# Patient Record
Sex: MALE | Race: WHITE | Employment: STUDENT | URBAN - METROPOLITAN AREA
[De-identification: names, ages, dates, MRNs, and addresses within clinical notes are randomized per-mention and may not be internally consistent; named-entity substitution may affect disease eponyms.]

---
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
## 2025-01-22 ENCOUNTER — OFFICE VISIT (OUTPATIENT)
Age: 20
End: 2025-01-22

## 2025-01-22 VITALS
WEIGHT: 200 LBS | BODY MASS INDEX: 26.51 KG/M2 | SYSTOLIC BLOOD PRESSURE: 110 MMHG | RESPIRATION RATE: 18 BRPM | OXYGEN SATURATION: 98 % | HEIGHT: 73 IN | HEART RATE: 97 BPM | DIASTOLIC BLOOD PRESSURE: 70 MMHG | TEMPERATURE: 98.9 F

## 2025-01-22 DIAGNOSIS — F31.9 BIPOLAR 1 DISORDER (HCC): Primary | ICD-10-CM

## 2025-01-22 DIAGNOSIS — L98.9 SKIN LESION OF BACK: ICD-10-CM

## 2025-01-22 SDOH — ECONOMIC STABILITY: FOOD INSECURITY: WITHIN THE PAST 12 MONTHS, THE FOOD YOU BOUGHT JUST DIDN'T LAST AND YOU DIDN'T HAVE MONEY TO GET MORE.: NEVER TRUE

## 2025-01-22 SDOH — ECONOMIC STABILITY: FOOD INSECURITY: WITHIN THE PAST 12 MONTHS, YOU WORRIED THAT YOUR FOOD WOULD RUN OUT BEFORE YOU GOT MONEY TO BUY MORE.: NEVER TRUE

## 2025-01-23 ASSESSMENT — ENCOUNTER SYMPTOMS
GASTROINTESTINAL NEGATIVE: 1
RESPIRATORY NEGATIVE: 1
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
## 2025-03-12 ENCOUNTER — HOSPITAL ENCOUNTER (OUTPATIENT)
Age: 20
Setting detail: OBSERVATION
Discharge: PSYCHIATRIC OR SUBSTANCE USE FACILITY | End: 2025-03-15
Attending: STUDENT IN AN ORGANIZED HEALTH CARE EDUCATION/TRAINING PROGRAM | Admitting: HOSPITALIST
Payer: MEDICAID

## 2025-03-12 DIAGNOSIS — T65.91XA INGESTION OF TOXIC SUBSTANCE: Primary | ICD-10-CM

## 2025-03-12 LAB
ALBUMIN SERPL-MCNC: 4.6 G/DL (ref 3.5–5.2)
ALP SERPL-CCNC: 93 U/L (ref 40–129)
ALT SERPL-CCNC: 11 U/L (ref 0–40)
AMMONIA PLAS-SCNC: 25 UMOL/L (ref 16–60)
ANION GAP SERPL CALCULATED.3IONS-SCNC: 11 MMOL/L (ref 7–16)
AST SERPL-CCNC: 15 U/L (ref 0–39)
BASOPHILS # BLD: 0.07 K/UL (ref 0–0.2)
BASOPHILS NFR BLD: 1 % (ref 0–2)
BILIRUB SERPL-MCNC: 0.4 MG/DL (ref 0–1.2)
BUN SERPL-MCNC: 12 MG/DL (ref 6–20)
CALCIUM SERPL-MCNC: 9.5 MG/DL (ref 8.6–10.2)
CHLORIDE SERPL-SCNC: 103 MMOL/L (ref 98–107)
CK SERPL-CCNC: 109 U/L (ref 20–200)
CO2 SERPL-SCNC: 25 MMOL/L (ref 22–29)
CREAT SERPL-MCNC: 1.1 MG/DL (ref 0.7–1.2)
EOSINOPHIL # BLD: 0.12 K/UL (ref 0.05–0.5)
EOSINOPHILS RELATIVE PERCENT: 2 % (ref 0–6)
ERYTHROCYTE [DISTWIDTH] IN BLOOD BY AUTOMATED COUNT: 12.4 % (ref 11.5–15)
GFR, ESTIMATED: >90 ML/MIN/1.73M2
GLUCOSE SERPL-MCNC: 100 MG/DL (ref 74–99)
HCT VFR BLD AUTO: 44.9 % (ref 37–54)
HGB BLD-MCNC: 14.8 G/DL (ref 12.5–16.5)
IMM GRANULOCYTES # BLD AUTO: <0.03 K/UL (ref 0–0.58)
IMM GRANULOCYTES NFR BLD: 0 % (ref 0–5)
INR PPP: 1.3
LACTATE BLDV-SCNC: 0.9 MMOL/L (ref 0.5–2.2)
LYMPHOCYTES NFR BLD: 2.04 K/UL (ref 1.5–4)
LYMPHOCYTES RELATIVE PERCENT: 32 % (ref 20–42)
MCH RBC QN AUTO: 29.1 PG (ref 26–35)
MCHC RBC AUTO-ENTMCNC: 33 G/DL (ref 32–34.5)
MCV RBC AUTO: 88.2 FL (ref 80–99.9)
MONOCYTES NFR BLD: 0.42 K/UL (ref 0.1–0.95)
MONOCYTES NFR BLD: 7 % (ref 2–12)
NEUTROPHILS NFR BLD: 59 % (ref 43–80)
NEUTS SEG NFR BLD: 3.82 K/UL (ref 1.8–7.3)
PARTIAL THROMBOPLASTIN TIME: 31 SEC (ref 24.5–35.1)
PHOSPHATE SERPL-MCNC: 3.5 MG/DL (ref 2.5–4.5)
PLATELET # BLD AUTO: 234 K/UL (ref 130–450)
PMV BLD AUTO: 9.1 FL (ref 7–12)
POTASSIUM SERPL-SCNC: 3.9 MMOL/L (ref 3.5–5)
PROT SERPL-MCNC: 7.8 G/DL (ref 6.4–8.3)
PROTHROMBIN TIME: 13.7 SEC (ref 9.3–12.4)
RBC # BLD AUTO: 5.09 M/UL (ref 3.8–5.8)
SODIUM SERPL-SCNC: 139 MMOL/L (ref 132–146)
WBC OTHER # BLD: 6.5 K/UL (ref 4.5–11.5)

## 2025-03-12 PROCEDURE — G0378 HOSPITAL OBSERVATION PER HR: HCPCS

## 2025-03-12 PROCEDURE — 83605 ASSAY OF LACTIC ACID: CPT

## 2025-03-12 PROCEDURE — 80307 DRUG TEST PRSMV CHEM ANLYZR: CPT

## 2025-03-12 PROCEDURE — 85025 COMPLETE CBC W/AUTO DIFF WBC: CPT

## 2025-03-12 PROCEDURE — 80178 ASSAY OF LITHIUM: CPT

## 2025-03-12 PROCEDURE — 82140 ASSAY OF AMMONIA: CPT

## 2025-03-12 PROCEDURE — G0480 DRUG TEST DEF 1-7 CLASSES: HCPCS

## 2025-03-12 PROCEDURE — 96374 THER/PROPH/DIAG INJ IV PUSH: CPT

## 2025-03-12 PROCEDURE — 80143 DRUG ASSAY ACETAMINOPHEN: CPT

## 2025-03-12 PROCEDURE — 6360000002 HC RX W HCPCS

## 2025-03-12 PROCEDURE — 84100 ASSAY OF PHOSPHORUS: CPT

## 2025-03-12 PROCEDURE — 82550 ASSAY OF CK (CPK): CPT

## 2025-03-12 PROCEDURE — 80053 COMPREHEN METABOLIC PANEL: CPT

## 2025-03-12 PROCEDURE — 80179 DRUG ASSAY SALICYLATE: CPT

## 2025-03-12 PROCEDURE — 99285 EMERGENCY DEPT VISIT HI MDM: CPT

## 2025-03-12 PROCEDURE — 93005 ELECTROCARDIOGRAM TRACING: CPT | Performed by: STUDENT IN AN ORGANIZED HEALTH CARE EDUCATION/TRAINING PROGRAM

## 2025-03-12 PROCEDURE — 85610 PROTHROMBIN TIME: CPT

## 2025-03-12 PROCEDURE — 85730 THROMBOPLASTIN TIME PARTIAL: CPT

## 2025-03-12 RX ORDER — LORAZEPAM 2 MG/ML
INJECTION INTRAMUSCULAR
Status: COMPLETED
Start: 2025-03-12 | End: 2025-03-12

## 2025-03-12 RX ORDER — LORAZEPAM 2 MG/ML
2 INJECTION INTRAMUSCULAR ONCE
Status: COMPLETED | OUTPATIENT
Start: 2025-03-12 | End: 2025-03-12

## 2025-03-12 RX ADMIN — LORAZEPAM 2 MG: 2 INJECTION INTRAMUSCULAR at 21:25

## 2025-03-12 RX ADMIN — LORAZEPAM 2 MG: 2 INJECTION INTRAMUSCULAR; INTRAVENOUS at 21:25

## 2025-03-12 ASSESSMENT — ENCOUNTER SYMPTOMS
VOMITING: 1
COUGH: 0
ABDOMINAL PAIN: 0
PHOTOPHOBIA: 0
SORE THROAT: 0
SHORTNESS OF BREATH: 0
BACK PAIN: 0
DIARRHEA: 0
NAUSEA: 0

## 2025-03-12 ASSESSMENT — LIFESTYLE VARIABLES
HOW OFTEN DO YOU HAVE A DRINK CONTAINING ALCOHOL: NEVER
HOW MANY STANDARD DRINKS CONTAINING ALCOHOL DO YOU HAVE ON A TYPICAL DAY: PATIENT DOES NOT DRINK

## 2025-03-13 PROBLEM — F17.200 NICOTINE USE DISORDER: Status: ACTIVE | Noted: 2024-04-02

## 2025-03-13 PROBLEM — F29 PSYCHOSIS (HCC): Status: ACTIVE | Noted: 2024-03-31

## 2025-03-13 PROBLEM — F06.1 CATATONIA: Status: ACTIVE | Noted: 2024-04-14

## 2025-03-13 LAB
AMPHET UR QL SCN: NEGATIVE
ANION GAP SERPL CALCULATED.3IONS-SCNC: 10 MMOL/L (ref 7–16)
APAP SERPL-MCNC: <5 UG/ML (ref 10–30)
BARBITURATES UR QL SCN: NEGATIVE
BASOPHILS # BLD: 0.08 K/UL (ref 0–0.2)
BASOPHILS NFR BLD: 2 % (ref 0–2)
BENZODIAZ UR QL: POSITIVE
BILIRUB UR QL STRIP: NEGATIVE
BUN SERPL-MCNC: 12 MG/DL (ref 6–20)
BUPRENORPHINE UR QL: NEGATIVE
CALCIUM SERPL-MCNC: 9.1 MG/DL (ref 8.6–10.2)
CANNABINOIDS UR QL SCN: POSITIVE
CHLORIDE SERPL-SCNC: 105 MMOL/L (ref 98–107)
CLARITY UR: CLEAR
CO2 SERPL-SCNC: 25 MMOL/L (ref 22–29)
COCAINE UR QL SCN: NEGATIVE
COLOR UR: YELLOW
CREAT SERPL-MCNC: 1 MG/DL (ref 0.7–1.2)
CRYSTALS URNS MICRO: ABNORMAL /HPF
EOSINOPHIL # BLD: 0.21 K/UL (ref 0.05–0.5)
EOSINOPHILS RELATIVE PERCENT: 4 % (ref 0–6)
ERYTHROCYTE [DISTWIDTH] IN BLOOD BY AUTOMATED COUNT: 12.4 % (ref 11.5–15)
ETHANOLAMINE SERPL-MCNC: <10 MG/DL (ref 0–0.08)
FENTANYL UR QL: NEGATIVE
GFR, ESTIMATED: >90 ML/MIN/1.73M2
GLUCOSE SERPL-MCNC: 103 MG/DL (ref 74–99)
GLUCOSE UR STRIP-MCNC: NEGATIVE MG/DL
HCT VFR BLD AUTO: 43.2 % (ref 37–54)
HGB BLD-MCNC: 14 G/DL (ref 12.5–16.5)
HGB UR QL STRIP.AUTO: NEGATIVE
IMM GRANULOCYTES # BLD AUTO: <0.03 K/UL (ref 0–0.58)
IMM GRANULOCYTES NFR BLD: 0 % (ref 0–5)
INR PPP: 1.3
KETONES UR STRIP-MCNC: NEGATIVE MG/DL
LEUKOCYTE ESTERASE UR QL STRIP: NEGATIVE
LITHIUM LEVEL: <0.1 MMOL/L (ref 0.5–1.5)
LYMPHOCYTES NFR BLD: 1.72 K/UL (ref 1.5–4)
LYMPHOCYTES RELATIVE PERCENT: 32 % (ref 20–42)
MCH RBC QN AUTO: 29 PG (ref 26–35)
MCHC RBC AUTO-ENTMCNC: 32.4 G/DL (ref 32–34.5)
MCV RBC AUTO: 89.6 FL (ref 80–99.9)
METHADONE UR QL: NEGATIVE
MONOCYTES NFR BLD: 0.45 K/UL (ref 0.1–0.95)
MONOCYTES NFR BLD: 8 % (ref 2–12)
NEUTROPHILS NFR BLD: 54 % (ref 43–80)
NEUTS SEG NFR BLD: 2.86 K/UL (ref 1.8–7.3)
NITRITE UR QL STRIP: NEGATIVE
OPIATES UR QL SCN: NEGATIVE
OXYCODONE UR QL SCN: NEGATIVE
PCP UR QL SCN: NEGATIVE
PH UR STRIP: 5.5 [PH] (ref 5–8)
PLATELET # BLD AUTO: 191 K/UL (ref 130–450)
PMV BLD AUTO: 9.2 FL (ref 7–12)
POTASSIUM SERPL-SCNC: 3.9 MMOL/L (ref 3.5–5)
PROT UR STRIP-MCNC: NEGATIVE MG/DL
PROTHROMBIN TIME: 13.4 SEC (ref 9.3–12.4)
RBC # BLD AUTO: 4.82 M/UL (ref 3.8–5.8)
RBC #/AREA URNS HPF: ABNORMAL /HPF
SALICYLATES SERPL-MCNC: <0.3 MG/DL (ref 0–30)
SODIUM SERPL-SCNC: 140 MMOL/L (ref 132–146)
SP GR UR STRIP: >1.03 (ref 1–1.03)
TEST INFORMATION: ABNORMAL
TOXIC TRICYCLIC SC,BLOOD: NEGATIVE
UROBILINOGEN UR STRIP-ACNC: 0.2 EU/DL (ref 0–1)
WBC #/AREA URNS HPF: ABNORMAL /HPF
WBC OTHER # BLD: 5.3 K/UL (ref 4.5–11.5)

## 2025-03-13 PROCEDURE — 6370000000 HC RX 637 (ALT 250 FOR IP)

## 2025-03-13 PROCEDURE — 80048 BASIC METABOLIC PNL TOTAL CA: CPT

## 2025-03-13 PROCEDURE — 85025 COMPLETE CBC W/AUTO DIFF WBC: CPT

## 2025-03-13 PROCEDURE — 90792 PSYCH DIAG EVAL W/MED SRVCS: CPT

## 2025-03-13 PROCEDURE — 6370000000 HC RX 637 (ALT 250 FOR IP): Performed by: NURSE PRACTITIONER

## 2025-03-13 PROCEDURE — 2500000003 HC RX 250 WO HCPCS: Performed by: HOSPITALIST

## 2025-03-13 PROCEDURE — 6370000000 HC RX 637 (ALT 250 FOR IP): Performed by: HOSPITALIST

## 2025-03-13 PROCEDURE — 99223 1ST HOSP IP/OBS HIGH 75: CPT | Performed by: HOSPITALIST

## 2025-03-13 PROCEDURE — 81001 URINALYSIS AUTO W/SCOPE: CPT

## 2025-03-13 PROCEDURE — G0378 HOSPITAL OBSERVATION PER HR: HCPCS

## 2025-03-13 PROCEDURE — 85610 PROTHROMBIN TIME: CPT

## 2025-03-13 PROCEDURE — 6370000000 HC RX 637 (ALT 250 FOR IP): Performed by: INTERNAL MEDICINE

## 2025-03-13 PROCEDURE — 80307 DRUG TEST PRSMV CHEM ANLYZR: CPT

## 2025-03-13 RX ORDER — CLONAZEPAM 0.5 MG/1
0.5 TABLET ORAL EVERY 12 HOURS
Status: DISCONTINUED | OUTPATIENT
Start: 2025-03-13 | End: 2025-03-13

## 2025-03-13 RX ORDER — MAGNESIUM HYDROXIDE/ALUMINUM HYDROXICE/SIMETHICONE 120; 1200; 1200 MG/30ML; MG/30ML; MG/30ML
30 SUSPENSION ORAL EVERY 6 HOURS PRN
Status: DISCONTINUED | OUTPATIENT
Start: 2025-03-13 | End: 2025-03-15 | Stop reason: HOSPADM

## 2025-03-13 RX ORDER — ACETAMINOPHEN 650 MG/1
650 SUPPOSITORY RECTAL EVERY 6 HOURS PRN
Status: DISCONTINUED | OUTPATIENT
Start: 2025-03-13 | End: 2025-03-15 | Stop reason: HOSPADM

## 2025-03-13 RX ORDER — POTASSIUM CHLORIDE 1500 MG/1
40 TABLET, EXTENDED RELEASE ORAL PRN
Status: DISCONTINUED | OUTPATIENT
Start: 2025-03-13 | End: 2025-03-15 | Stop reason: HOSPADM

## 2025-03-13 RX ORDER — ACETAMINOPHEN 325 MG/1
650 TABLET ORAL EVERY 6 HOURS PRN
Status: DISCONTINUED | OUTPATIENT
Start: 2025-03-13 | End: 2025-03-15 | Stop reason: HOSPADM

## 2025-03-13 RX ORDER — POLYETHYLENE GLYCOL 3350 17 G
2 POWDER IN PACKET (EA) ORAL
Status: DISCONTINUED | OUTPATIENT
Start: 2025-03-13 | End: 2025-03-15 | Stop reason: HOSPADM

## 2025-03-13 RX ORDER — ONDANSETRON 2 MG/ML
4 INJECTION INTRAMUSCULAR; INTRAVENOUS EVERY 6 HOURS PRN
Status: DISCONTINUED | OUTPATIENT
Start: 2025-03-13 | End: 2025-03-15 | Stop reason: HOSPADM

## 2025-03-13 RX ORDER — MAGNESIUM SULFATE IN WATER 40 MG/ML
2000 INJECTION, SOLUTION INTRAVENOUS PRN
Status: DISCONTINUED | OUTPATIENT
Start: 2025-03-13 | End: 2025-03-15 | Stop reason: HOSPADM

## 2025-03-13 RX ORDER — SODIUM CHLORIDE 9 MG/ML
INJECTION, SOLUTION INTRAVENOUS PRN
Status: DISCONTINUED | OUTPATIENT
Start: 2025-03-13 | End: 2025-03-15 | Stop reason: HOSPADM

## 2025-03-13 RX ORDER — POTASSIUM CHLORIDE 7.45 MG/ML
10 INJECTION INTRAVENOUS PRN
Status: DISCONTINUED | OUTPATIENT
Start: 2025-03-13 | End: 2025-03-15 | Stop reason: HOSPADM

## 2025-03-13 RX ORDER — HALOPERIDOL 5 MG/ML
5 INJECTION INTRAMUSCULAR EVERY 6 HOURS PRN
Status: DISCONTINUED | OUTPATIENT
Start: 2025-03-13 | End: 2025-03-15 | Stop reason: HOSPADM

## 2025-03-13 RX ORDER — LITHIUM CARBONATE 300 MG/1
300 CAPSULE ORAL 2 TIMES DAILY WITH MEALS
Status: DISCONTINUED | OUTPATIENT
Start: 2025-03-13 | End: 2025-03-15 | Stop reason: HOSPADM

## 2025-03-13 RX ORDER — ALPRAZOLAM 0.25 MG
0.5 TABLET ORAL 3 TIMES DAILY PRN
Status: DISCONTINUED | OUTPATIENT
Start: 2025-03-13 | End: 2025-03-15 | Stop reason: HOSPADM

## 2025-03-13 RX ORDER — SODIUM CHLORIDE 0.9 % (FLUSH) 0.9 %
5-40 SYRINGE (ML) INJECTION EVERY 12 HOURS SCHEDULED
Status: DISCONTINUED | OUTPATIENT
Start: 2025-03-13 | End: 2025-03-15 | Stop reason: HOSPADM

## 2025-03-13 RX ORDER — LITHIUM CARBONATE 300 MG
600 TABLET ORAL DAILY
COMMUNITY

## 2025-03-13 RX ORDER — SODIUM CHLORIDE 0.9 % (FLUSH) 0.9 %
5-40 SYRINGE (ML) INJECTION PRN
Status: DISCONTINUED | OUTPATIENT
Start: 2025-03-13 | End: 2025-03-15 | Stop reason: HOSPADM

## 2025-03-13 RX ORDER — PHYTONADIONE 5 MG/1
5 TABLET ORAL DAILY
Status: DISCONTINUED | OUTPATIENT
Start: 2025-03-13 | End: 2025-03-13

## 2025-03-13 RX ORDER — LORAZEPAM 1 MG/1
1 TABLET ORAL 2 TIMES DAILY
Status: DISCONTINUED | OUTPATIENT
Start: 2025-03-13 | End: 2025-03-15 | Stop reason: HOSPADM

## 2025-03-13 RX ORDER — ONDANSETRON 4 MG/1
4 TABLET, ORALLY DISINTEGRATING ORAL EVERY 8 HOURS PRN
Status: DISCONTINUED | OUTPATIENT
Start: 2025-03-13 | End: 2025-03-15 | Stop reason: HOSPADM

## 2025-03-13 RX ORDER — SENNOSIDES A AND B 8.6 MG/1
1 TABLET, FILM COATED ORAL DAILY PRN
Status: DISCONTINUED | OUTPATIENT
Start: 2025-03-13 | End: 2025-03-15 | Stop reason: HOSPADM

## 2025-03-13 RX ORDER — POLYETHYLENE GLYCOL 3350 17 G/17G
17 POWDER, FOR SOLUTION ORAL DAILY PRN
Status: DISCONTINUED | OUTPATIENT
Start: 2025-03-13 | End: 2025-03-15 | Stop reason: HOSPADM

## 2025-03-13 RX ADMIN — LORAZEPAM 1 MG: 1 TABLET ORAL at 20:52

## 2025-03-13 RX ADMIN — PHYTONADIONE 5 MG: 5 TABLET ORAL at 11:02

## 2025-03-13 RX ADMIN — ALPRAZOLAM 0.5 MG: 0.25 TABLET ORAL at 14:31

## 2025-03-13 RX ADMIN — SODIUM CHLORIDE, PRESERVATIVE FREE 10 ML: 5 INJECTION INTRAVENOUS at 20:52

## 2025-03-13 RX ADMIN — SODIUM CHLORIDE, PRESERVATIVE FREE 10 ML: 5 INJECTION INTRAVENOUS at 11:02

## 2025-03-13 RX ADMIN — ALPRAZOLAM 0.5 MG: 0.25 TABLET ORAL at 08:49

## 2025-03-13 RX ADMIN — CLONAZEPAM 0.5 MG: 0.5 TABLET ORAL at 12:23

## 2025-03-13 RX ADMIN — ALPRAZOLAM 0.5 MG: 0.25 TABLET ORAL at 01:07

## 2025-03-13 RX ADMIN — LITHIUM CARBONATE 300 MG: 300 CAPSULE, GELATIN COATED ORAL at 16:15

## 2025-03-13 ASSESSMENT — VISUAL ACUITY: OU: 1

## 2025-03-13 NOTE — ED NOTES
ED to Inpatient Handoff Report    Notified Carina that electronic handoff available and patient ready for transport to room 706.    Safety Risks: None identified    Patient in Restraints: no    Constant Observer or Patient : no    Telemetry Monitoring Ordered: No          Order to transfer to unit without monitor: NA    Last MEWS: 1 Time completed: 2331    Deterioration Index: 13.5    Vitals:    03/12/25 2015 03/12/25 2100 03/12/25 2331   BP: (!) 158/87  (!) 164/97   Pulse: 99  86   Resp: 16  18   Temp: 98.6 °F (37 °C)  98.4 °F (36.9 °C)   TempSrc: Oral     SpO2: 97%  97%   Weight: 93 kg (205 lb) 88.5 kg (195 lb)    Height: 1.854 m (6' 1\") 1.854 m (6' 1\")        Opportunity for questions and clarification was provided.
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
## 2025-03-13 NOTE — ED NOTES
Discussed case with Nick at Poison Control. Potential for 3 types of ingested poison; listed below.     Older versions of rat poison increase the clotting factors  Vitamin D versions lead to hypercalcemia  Calcium citrate containing types can lead to significant dehydration    Recommending the following based on ingestion of unknown type of rat poisoning:  - EKG  - Baseline Coagulation studies and repeat in 48 hours  - Baseline ASA/APAP levels  - Electrolytes; specifically calcium and phosphorus every 6 hours for 12 hours and every 24 hours thereafter if WNL.  - Recommending monitoring for seizures in the case of co-ingestants    Patient does have a fill history of other psychiatric medications. Medication filled in the last 30 days listed below.     - Bupropion  mg #30  - Buspirone 10 mg #60  - Hydroxyzine 50 mg caps #60  - Lithium 150 mg #30  Quetiapine 25 mg #7    Electronically signed by Kathrin Gomes RPH, Pharm.D. 3/12/2025 9:01 PM   Ext: [id number]

## 2025-03-13 NOTE — PROGRESS NOTES
4 Eyes Skin Assessment     NAME:  Luis Jacobs  YOB: 2005  MEDICAL RECORD NUMBER:  [id number]    The patient is being assessed for  Admission    I agree that at least one RN has performed a thorough Head to Toe Skin Assessment on the patient. ALL assessment sites listed below have been assessed.      Areas assessed by both nurses:    Other pt refused skin assessment , abrasion noted on BUE         Does the Patient have a Wound? No noted wound(s) per patient       John Paul Prevention initiated by RN: No  Wound Care Orders initiated by RN: No    Pressure Injury (Stage 3,4, Unstageable, DTI, NWPT, and Complex wounds) if present, place Wound referral order by RN under : No    New Ostomies, if present place, Ostomy referral order under : No     Nurse 1 eSignature: Electronically signed by Carina Giraldo RN on 3/13/25 at 5:57 AM EDT    **SHARE this note so that the co-signing nurse can place an eSignature**    Nurse 2 eSignature: Electronically signed by VANESSA GARCIA RN on 3/13/25 at 6:00 AM EDT

## 2025-03-13 NOTE — H&P
25.73 kg/m²   Physical Exam  Vitals reviewed.   Constitutional:       General: He is awake. He is not in acute distress.     Appearance: He is not ill-appearing or toxic-appearing.   HENT:      Mouth/Throat:      Mouth: Mucous membranes are moist.   Eyes:      General: Vision grossly intact.      Extraocular Movements: Extraocular movements intact.      Conjunctiva/sclera: Conjunctivae normal.   Neck:      Thyroid: No thyromegaly.      Trachea: Trachea normal.   Pulmonary:      Effort: Pulmonary effort is normal.   Abdominal:      General: Abdomen is flat. There is no distension.   Skin:     Coloration: Skin is not jaundiced.      Findings: No wound.   Neurological:      General: No focal deficit present.   Psychiatric:         Mood and Affect: Affect is labile.         Speech: Speech normal.         Behavior: Behavior is uncooperative and agitated.           LABS:  CBC:   Lab Results   Component Value Date/Time    WBC 6.5 03/12/2025 08:36 PM    RBC 5.09 03/12/2025 08:36 PM    HGB 14.8 03/12/2025 08:36 PM    HCT 44.9 03/12/2025 08:36 PM     03/12/2025 08:36 PM    MCV 88.2 03/12/2025 08:36 PM     BMP:    Lab Results   Component Value Date/Time     03/12/2025 08:36 PM    K 3.9 03/12/2025 08:36 PM     03/12/2025 08:36 PM    CO2 25 03/12/2025 08:36 PM    BUN 12 03/12/2025 08:36 PM    CREATININE 1.1 03/12/2025 08:36 PM    GLUCOSE 100 03/12/2025 08:36 PM    CALCIUM 9.5 03/12/2025 08:36 PM     Hepatic Function Panel:    Lab Results   Component Value Date/Time    ALKPHOS 93 03/12/2025 08:36 PM    AST 15 03/12/2025 08:36 PM    ALT 11 03/12/2025 08:36 PM    BILITOT 0.4 03/12/2025 08:36 PM             Radiology:  No results found.          NOTE: This report was transcribed using voice recognition software. Every effort was made to ensure accuracy; however, inadvertent computerized transcription errors may be present.    Electronically signed by Agustin Valdez DO on 3/13/2025 at 3:00 AM

## 2025-03-13 NOTE — ED PROVIDER NOTES
[facility]  EMERGENCY DEPARTMENT ENCOUNTER        Pt Name: Luis Jacobs  MRN: [id number]  Birthdate 2005  Date of evaluation: 3/12/2025  Provider: Kenna Bustillos MD  PCP: Lance Mckeon MD  Note Started: 8:31 PM EDT 3/12/25    HPI  19 y.o. male presenting for ingestion.  Patient states he ate a piece of rat poison at his grandparents house today.  He thought was beef jerky.  He is on lithium but denies other drug or alcohol use.  He had emesis afterward.  He denies all complaints at this time.    --------------------------------------------- PAST HISTORY ---------------------------------------------  Past Medical History:  has no past medical history on file.    Past Surgical History:  has no past surgical history on file.    Social History:  reports that he has never smoked. He uses smokeless tobacco. He reports that he does not currently use alcohol. He reports current drug use. Drug: Marijuana (Weed).    Family History: family history is not on file.     The patient’s home medications have been reviewed.    Allergies: Seasonal      Review of Systems   Constitutional:  Negative for chills and fever.   HENT:  Negative for congestion and sore throat.    Eyes:  Negative for photophobia and visual disturbance.   Respiratory:  Negative for cough and shortness of breath.    Cardiovascular:  Negative for chest pain and leg swelling.   Gastrointestinal:  Positive for vomiting. Negative for abdominal pain, diarrhea and nausea.   Genitourinary:  Negative for dysuria and flank pain.   Musculoskeletal:  Negative for back pain and myalgias.   Skin:  Negative for rash and wound.   Neurological:  Negative for dizziness, light-headedness and headaches.        Physical Exam  Constitutional:       General: He is not in acute distress.     Appearance: Normal appearance. He is not ill-appearing.   HENT:      Head: Normocephalic and atraumatic.      Right Ear: External ear normal.      Left Ear: External ear normal.

## 2025-03-13 NOTE — PLAN OF CARE
Problem: Discharge Planning  Goal: Discharge to home or other facility with appropriate resources  Outcome: Progressing     Problem: Risk for Elopement  Goal: Patient will not exit the unit/facility without proper excort  Outcome: Progressing  Flowsheets (Taken 3/12/2025 2030 by Martina Beltre RN)  Nursing Interventions for Elopement Risk:   Assist with personal care needs such as toileting, eating, dressing, as needed to reduce the risk of wandering   Collaborate with family members/caregivers to mitigate the elopement risk   Collaborate with treatment team for drug withdrawal symptoms treatment   Collaborate with treatment team for nicotine replacement   Communicate/escalate to charge nurse the risk of elopement   Communicate/escalate to /other team member the risk of elopement   Communicate/escalate to nursing supervisor the risk of elopement   Communicate to physician the risk for elopement   Escort with two staff members if patient must leave the unit   Make sure patient has all necessary personal care items   Reduce environmental triggers   Place patient in room far away from exits and stairways     Problem: ABCDS Injury Assessment  Goal: Absence of physical injury  Outcome: Progressing

## 2025-03-13 NOTE — PROGRESS NOTES
Poison control called nursing for an update. Pt is alert and oriented x 4 but manic. No gastrointestinal symptoms at this time.     Pt too agitated for skin assessment. Refusing to wear a gown and hospital socks.

## 2025-03-13 NOTE — PROGRESS NOTES
Patient was denied transfer downtown, they are calling access center to find another facility. Doctors and family notified.

## 2025-03-13 NOTE — ED NOTES
Pt referred to [facility] Center. Spoke with Nik. Will start looking for bed once chart has medically clearance note from

## 2025-03-13 NOTE — PLAN OF CARE
Patient admitted by nocturnist this morning.  Per H&P:  19 y.o.-year-old male with a PMH of severe bipolar with psychosis, PTSD & nicotine dependence who present 1 hour after accidentally ingesting rat poison. Patient did not cooperate with the evaluation. His mother was at the bedside. She feels he is having a manic episode. His behavior has been erratic. Reportedly he ingested about 1 oz of Diphacinone an hour prior to arrival. ED physician informed me that he threw up prior to presentation. In the ED he was acting erratically and was given ativan. When I attempted to interact with him. He told me to \"keep it down\" and \"you're the problem.\"     ASSESSMENT:       Principal Problem:    Ingestion of toxic substance  Active Problems:    Severe manic bipolar 1 disorder with psychotic behavior (HCC)    Posttraumatic stress disorder    Polysubstance abuse (HCC)    Nicotine use disorder  Resolved Problems:    * No resolved hospital problems. *           PLAN:     toxic ingestion- undetermined intent: POA, reportedly mistook Diphacinone for \"beef jerky.\"  Given patients psychiatric history, this is concerning for suicide attempt/ gesture. Poison control consulted in ED.  Monitor daily INR.  Started patient on daily p.o. vitamin K (half-life approximately 15 days)  Severe type I bipolar with acute indy/psychosis: Patient on psychiatric hold.  May not leave AMA.  Sitter at the bedside.  As needed Ativan & IM Haldol 5 mg every 6 for agitation.  Consult psychiatry.          Pt evaluated. I feel patient needs inpatient  psychiatric treatment for acute indy with overdose    Garrett López MD

## 2025-03-14 LAB
EKG ATRIAL RATE: 72 BPM
EKG P AXIS: 78 DEGREES
EKG P-R INTERVAL: 150 MS
EKG Q-T INTERVAL: 352 MS
EKG QRS DURATION: 86 MS
EKG QTC CALCULATION (BAZETT): 385 MS
EKG R AXIS: 35 DEGREES
EKG T AXIS: 26 DEGREES
EKG VENTRICULAR RATE: 72 BPM
INR PPP: 1.1
PROTHROMBIN TIME: 12.1 SEC (ref 9.3–12.4)

## 2025-03-14 PROCEDURE — 6370000000 HC RX 637 (ALT 250 FOR IP): Performed by: NURSE PRACTITIONER

## 2025-03-14 PROCEDURE — G0378 HOSPITAL OBSERVATION PER HR: HCPCS

## 2025-03-14 PROCEDURE — 85610 PROTHROMBIN TIME: CPT

## 2025-03-14 PROCEDURE — 6370000000 HC RX 637 (ALT 250 FOR IP)

## 2025-03-14 PROCEDURE — 2500000003 HC RX 250 WO HCPCS: Performed by: HOSPITALIST

## 2025-03-14 PROCEDURE — 99232 SBSQ HOSP IP/OBS MODERATE 35: CPT | Performed by: INTERNAL MEDICINE

## 2025-03-14 PROCEDURE — 93010 ELECTROCARDIOGRAM REPORT: CPT | Performed by: INTERNAL MEDICINE

## 2025-03-14 RX ADMIN — LITHIUM CARBONATE 300 MG: 300 CAPSULE, GELATIN COATED ORAL at 08:46

## 2025-03-14 RX ADMIN — ALPRAZOLAM 0.5 MG: 0.25 TABLET ORAL at 20:59

## 2025-03-14 RX ADMIN — LORAZEPAM 1 MG: 1 TABLET ORAL at 22:24

## 2025-03-14 RX ADMIN — ALPRAZOLAM 0.5 MG: 0.25 TABLET ORAL at 08:46

## 2025-03-14 RX ADMIN — SODIUM CHLORIDE, PRESERVATIVE FREE 10 ML: 5 INJECTION INTRAVENOUS at 08:49

## 2025-03-14 RX ADMIN — ALPRAZOLAM 0.5 MG: 0.25 TABLET ORAL at 14:47

## 2025-03-14 RX ADMIN — LORAZEPAM 1 MG: 1 TABLET ORAL at 09:48

## 2025-03-14 RX ADMIN — ALPRAZOLAM 0.5 MG: 0.25 TABLET ORAL at 00:03

## 2025-03-14 RX ADMIN — LITHIUM CARBONATE 300 MG: 300 CAPSULE, GELATIN COATED ORAL at 16:40

## 2025-03-14 RX ADMIN — SODIUM CHLORIDE, PRESERVATIVE FREE 10 ML: 5 INJECTION INTRAVENOUS at 20:59

## 2025-03-14 NOTE — CARE COORDINATION
Social Work:    Social work was advised by charge RN that patient is medically cleared for psych transfer. Social work, along with charge RN Belinda spoke with Bhargavi ([phone] option 4 at the access line and advised her of preference for transfer to [facility] in [place]. Bhargavi is going to check there, as well as [facility] in [place] and will phone back. (Ambulance form completed)    Electronically signed by LENORA Moore on 3/14/2025 at 12:36 PM

## 2025-03-14 NOTE — PROGRESS NOTES
Patient placed in safety gown. Belongings placed in hospital belonging bag: bookbag, rickie device, phone , shoes, pants, shirt, and t-shirt. Jewelry (bracelet and necklace) placed in specimen cup and in bag with belongings. Belongings marked with patient ID stickers and placed in nurses station. CO sitting in room.

## 2025-03-14 NOTE — PROGRESS NOTES
Access center notified that we received medical clearance and mother is requesting [facility] if possible. Stated they will call with update on availabilities

## 2025-03-14 NOTE — PROGRESS NOTES
Patient upset when he woke up and saw his mom in room. Stated \"I want her out of here now and I do not like waking up to see her\", mother left room saying he will improve after he takes his lithium. Cooperative after she left .

## 2025-03-14 NOTE — CARE COORDINATION
Social Work:    Meera at the Access center advised that [facility], a dual facility for psych & addiction can accept Luis, however, physician needs to obtain clearance from poison control at [phone]. Documentation of clearance is needed in order start the transfer process. Social service updated Dr. López.    Electronically signed by LENORA Moore on 3/14/2025 at 1:42 PM

## 2025-03-14 NOTE — CARE COORDINATION
Social Work:    Social work was advised by Dr. López that patient was cleared to transfer by poison control but does need another PT/INR in the a.m. Bhargavi at the Access center was advised by [facility] that they do not check the INR there, therefore, patient will need to transfer to [facility] after INR testing is completed tomorrow. Social service faxed the pink slip to [place] today as requested. The Access center will contact the floor directly and see if patient is stable for transfer, as well as arrange an ambulance transfer. RN will need to do N-N to [facility] at [phone] fax [phone].  Social work updated Winsome. Winsome will need updated on transfer time.  (Ambulance form completed)    Electronically signed by LENORA Moore on 3/14/2025 at 3:52 PM

## 2025-03-14 NOTE — PLAN OF CARE
Problem: Discharge Planning  Goal: Discharge to home or other facility with appropriate resources  Outcome: Progressing     Problem: Risk for Elopement  Goal: Patient will not exit the unit/facility without proper excort  Outcome: Progressing  Flowsheets (Taken 3/13/2025 2030)  Nursing Interventions for Elopement Risk:   Assist with personal care needs such as toileting, eating, dressing, as needed to reduce the risk of wandering   Collaborate with family members/caregivers to mitigate the elopement risk   Communicate/escalate to charge nurse the risk of elopement   Reduce environmental triggers     Problem: ABCDS Injury Assessment  Goal: Absence of physical injury  Outcome: Progressing

## 2025-03-14 NOTE — PLAN OF CARE
Problem: Discharge Planning  Goal: Discharge to home or other facility with appropriate resources  3/14/2025 1556 by Belinda Simons, RN  Outcome: Progressing     Problem: Risk for Elopement  Goal: Patient will not exit the unit/facility without proper excort  3/14/2025 1556 by Belinda Simons, RN  Outcome: Progressing     Problem: ABCDS Injury Assessment  Goal: Absence of physical injury  3/14/2025 1556 by Belinda Simons, RN  Outcome: Progressing

## 2025-03-14 NOTE — PROGRESS NOTES
Hospitalist Progress Note      Synopsis: Patient admitted on 3/12/2025 Patient admitted by latanya this morning.  Per H&P:  19 y.o.-year-old male with a PMH of severe bipolar with psychosis, PTSD & nicotine dependence who present 1 hour after accidentally ingesting rat poison. Patient did not cooperate with the evaluation. His mother was at the bedside. She feels he is having a manic episode. His behavior has been erratic. Reportedly he ingested about 1 oz of Diphacinone an hour prior to arrival. ED physician informed me that he threw up prior to presentation. Tells me that he stuck his fingers down his throat and forced himself to vomit.  In the ED he was acting erratically and was given ativan.  Patient was admitted for further evaluation and treatment.    ASSESSMENT:    Principal Problem:    Ingestion of toxic substance  Active Problems:    Posttraumatic stress disorder    Severe manic bipolar 1 disorder with psychotic behavior (HCC)    Polysubstance abuse (HCC)    Nicotine use disorder  Resolved Problems:    * No resolved hospital problems. *      ASSESSMENT:       Principal Problem:    Ingestion of toxic substance  Active Problems:    Severe manic bipolar 1 disorder with psychotic behavior (HCC)    Posttraumatic stress disorder    Polysubstance abuse (HCC)    Nicotine use disorder  Resolved Problems:    * No resolved hospital problems. *           PLAN:     toxic ingestion- reported as accidental : POA, reportedly mistook Diphacinone  (rat poison) for \"beef jerky.\"   Poison control consulted in ED.  Monitor daily INR.  received vitamin K PO x 1 plan for repeat INR tomorrow 3/15.  If this is negative no further testing is necessary.  Severe type I bipolar with acute indy/psychosis: Patient on psychiatric hold.  May not leave AMA.  Sitter at the bedside.  As needed Ativan & IM Haldol 5 mg every 6 for agitation.  Psychiatry Consult appreciated dscd case, [place] slipped. Plan for U inpatient

## 2025-03-15 VITALS
WEIGHT: 194.89 LBS | BODY MASS INDEX: 25.83 KG/M2 | SYSTOLIC BLOOD PRESSURE: 108 MMHG | OXYGEN SATURATION: 99 % | RESPIRATION RATE: 16 BRPM | TEMPERATURE: 97.3 F | HEIGHT: 73 IN | HEART RATE: 69 BPM | DIASTOLIC BLOOD PRESSURE: 64 MMHG

## 2025-03-15 PROBLEM — T65.91XA INGESTION OF TOXIC SUBSTANCE: Status: RESOLVED | Noted: 2025-03-12 | Resolved: 2025-03-15

## 2025-03-15 LAB
INR PPP: 1.2
INR PPP: 1.2
PROTHROMBIN TIME: 12.2 SEC (ref 9.3–12.4)
PROTHROMBIN TIME: 12.7 SEC (ref 9.3–12.4)

## 2025-03-15 PROCEDURE — 2500000003 HC RX 250 WO HCPCS: Performed by: HOSPITALIST

## 2025-03-15 PROCEDURE — 85610 PROTHROMBIN TIME: CPT

## 2025-03-15 PROCEDURE — 6370000000 HC RX 637 (ALT 250 FOR IP): Performed by: NURSE PRACTITIONER

## 2025-03-15 PROCEDURE — G0378 HOSPITAL OBSERVATION PER HR: HCPCS

## 2025-03-15 PROCEDURE — 99239 HOSP IP/OBS DSCHRG MGMT >30: CPT | Performed by: HOSPITALIST

## 2025-03-15 PROCEDURE — 6370000000 HC RX 637 (ALT 250 FOR IP)

## 2025-03-15 RX ORDER — UBIDECARENONE 75 MG
100 CAPSULE ORAL DAILY
Qty: 30 TABLET | Refills: 3 | Status: SHIPPED | OUTPATIENT
Start: 2025-03-15 | End: 2026-03-15

## 2025-03-15 RX ADMIN — SODIUM CHLORIDE, PRESERVATIVE FREE 10 ML: 5 INJECTION INTRAVENOUS at 08:05

## 2025-03-15 RX ADMIN — LORAZEPAM 1 MG: 1 TABLET ORAL at 09:25

## 2025-03-15 RX ADMIN — ALPRAZOLAM 0.5 MG: 0.25 TABLET ORAL at 08:03

## 2025-03-15 RX ADMIN — ALPRAZOLAM 0.5 MG: 0.25 TABLET ORAL at 14:44

## 2025-03-15 RX ADMIN — LITHIUM CARBONATE 300 MG: 300 CAPSULE, GELATIN COATED ORAL at 08:00

## 2025-03-15 NOTE — PLAN OF CARE
Problem: Discharge Planning  Goal: Discharge to home or other facility with appropriate resources  3/15/2025 0928 by Rohini Kumar RN  Outcome: Progressing  3/14/2025 2349 by Lola Aguero RN  Outcome: Progressing     Problem: Risk for Elopement  Goal: Patient will not exit the unit/facility without proper excort  3/15/2025 0928 by Rohini Kumar RN  Outcome: Progressing  3/14/2025 2349 by Lola Aguero RN  Outcome: Progressing     Problem: ABCDS Injury Assessment  Goal: Absence of physical injury  3/15/2025 0928 by Rohini Kumar RN  Outcome: Progressing  3/14/2025 2349 by Lola Aguero, RN  Outcome: Progressing

## 2025-03-15 NOTE — PROGRESS NOTES
Perfect serve message sent to Rebecca Hussein NP advising her of patient's PT result this morning of 12.7 and INR of 1.2

## 2025-03-15 NOTE — DISCHARGE SUMMARY
Discharge Summary    Patient:  Luis Jacobs  YOB: 2005    MRN: [id number]   Acct: [id number]    Primary Care Physician: Lance Mckeon MD    Admit date:  3/12/2025    Discharge date:   3/15/2025      Discharge Diagnoses:   Ingestion of toxic substance  Principal Problem (Resolved):    Ingestion of toxic substance  Active Problems:    Posttraumatic stress disorder    Severe manic bipolar 1 disorder with psychotic behavior (HCC)    Polysubstance abuse (HCC)    Nicotine use disorder        Admitted for: (HPI) Luis Jacobs is a 19 y.o.-year-old male with a PMH of severe bipolar with psychosis, PTSD & nicotine dependence who present 1 hour after accidentally ingesting rat poison. Patient did not cooperate with the evaluation. His mother was at the bedside. She feels he is having a manic episode. His behavior has been erratic. Reportedly he ingested about 1 oz of Diphacinone an hour prior to arrival. ED physician informed me that he threw up prior to presentation. In the ED he was acting erratically and was given ativan. When I attempted to interact with him. He told me to \"keep it down\" and \"you're the problem.\"      Hospital Course:  toxic ingestion- undetermined intent: POA, reportedly mistook Diphacinone for \"beef jerky.\"  Given patients psychiatric history, this is concerning for suicide attempt/ gesture. Poison control consulted in ED.  Monitor daily INR.  Started patient on daily p.o. vitamin K (half-life approximately 15 days).  INR noted to be 1.2 on day of discharge  Severe type I bipolar with acute indy/psychosis: Patient on psychiatric hold.  May not leave AMA.  Sitter at the bedside.  As needed Ativan & IM Haldol 5 mg every 6 for agitation.  Consult psychiatry.  Patient discharged to psychiatric hospital as patient does appear to be manic at this time.    Consultants: Psych, poison control    Discharge Medications:       Medication List        START taking these medications   definitive or confirmed.   Protime-INR   Result Value Ref Range    Protime 13.4 (H) 9.3 - 12.4 sec    INR 1.3    Basic Metabolic Panel w/ Reflex to MG   Result Value Ref Range    Sodium 140 132 - 146 mmol/L    Potassium 3.9 3.5 - 5.0 mmol/L    Chloride 105 98 - 107 mmol/L    CO2 25 22 - 29 mmol/L    Anion Gap 10 7 - 16 mmol/L    Glucose 103 (H) 74 - 99 mg/dL    BUN 12 6 - 20 mg/dL    Creatinine 1.0 0.70 - 1.20 mg/dL    Est, Glom Filt Rate >90 >60 mL/min/1.73m2    Calcium 9.1 8.6 - 10.2 mg/dL   CBC with Auto Differential   Result Value Ref Range    WBC 5.3 4.5 - 11.5 k/uL    RBC 4.82 3.80 - 5.80 m/uL    Hemoglobin 14.0 12.5 - 16.5 g/dL    Hematocrit 43.2 37.0 - 54.0 %    MCV 89.6 80.0 - 99.9 fL    MCH 29.0 26.0 - 35.0 pg    MCHC 32.4 32.0 - 34.5 g/dL    RDW 12.4 11.5 - 15.0 %    Platelets 191 130 - 450 k/uL    MPV 9.2 7.0 - 12.0 fL    Neutrophils % 54 43.0 - 80.0 %    Lymphocytes % 32 20.0 - 42.0 %    Monocytes % 8 2.0 - 12.0 %    Eosinophils % 4 0 - 6 %    Basophils % 2 0.0 - 2.0 %    Immature Granulocytes % 0 0.0 - 5.0 %    Neutrophils Absolute 2.86 1.80 - 7.30 k/uL    Lymphocytes Absolute 1.72 1.50 - 4.00 k/uL    Monocytes Absolute 0.45 0.10 - 0.95 k/uL    Eosinophils Absolute 0.21 0.05 - 0.50 k/uL    Basophils Absolute 0.08 0.00 - 0.20 k/uL    Immature Granulocytes Absolute <0.03 0.00 - 0.58 k/uL   Protime-INR   Result Value Ref Range    Protime 12.1 9.3 - 12.4 sec    INR 1.1    Protime-INR   Result Value Ref Range    Protime 12.7 (H) 9.3 - 12.4 sec    INR 1.2    Protime-INR   Result Value Ref Range    Protime 12.2 9.3 - 12.4 sec    INR 1.2    EKG 12 Lead   Result Value Ref Range    Ventricular Rate 72 BPM    Atrial Rate 72 BPM    P-R Interval 150 ms    QRS Duration 86 ms    Q-T Interval 352 ms    QTc Calculation (Bazett) 385 ms    P Axis 78 degrees    R Axis 35 degrees    T Axis 26 degrees       Diet:  ADULT DIET; Regular; Safety Tray; Safety Tray (Disposables)    Activity:  Activity as tolerated (Patient may

## 2025-03-15 NOTE — PROGRESS NOTES
[facility] Quality Flow/Interdisciplinary Rounds Progress Note        Quality Flow Rounds held on March 15, 2025    Disciplines Attending:  Bedside Nurse, , , Nursing Unit Leadership, and      Luis Arlene was admitted on 3/12/2025  8:19 PM    Anticipated Discharge Date:  Expected Discharge Date: 03/14/25    Disposition: [facility]     John Paul Score:  John Paul Scale Score: 22    [facility] RISK OF UNPLANNED READMISSION 2.0             0 Total Score        Discussed patient goal for the day, patient clinical progression, and barriers to discharge.  The following Goal(s) of the Day/Commitment(s) have been identified:  Diagnostics - Report Results PT and INR reported to Dr. Shaina Franklin, RN  March 15, 2025

## 2025-03-15 NOTE — PROGRESS NOTES
Nurse handoff report given to Nurse at [facility]. Patient stable at time of transport.

## 2025-03-15 NOTE — PROGRESS NOTES
Access Center called and stated they have a bed for patient.  He is going to [facility], to Dr. Franklin.  Report number is [phone].
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
## 2025-03-15 NOTE — PLAN OF CARE
Problem: Discharge Planning  Goal: Discharge to home or other facility with appropriate resources  3/14/2025 2349 by Lola Aguero RN  Outcome: Progressing  3/14/2025 1556 by Belinda Simons RN  Outcome: Progressing     Problem: Risk for Elopement  Goal: Patient will not exit the unit/facility without proper excort  3/14/2025 2349 by Lola Aguero RN  Outcome: Progressing  3/14/2025 1556 by Belinda Simons RN  Outcome: Progressing     Problem: ABCDS Injury Assessment  Goal: Absence of physical injury  3/14/2025 2349 by Lola Aguero RN  Outcome: Progressing  3/14/2025 1556 by Belinda Simons RN  Outcome: Progressing

## 2025-03-15 NOTE — PLAN OF CARE
Problem: Discharge Planning  Goal: Discharge to home or other facility with appropriate resources  3/15/2025 1555 by Rohini Kumar RN  Outcome: Completed  3/15/2025 0928 by Rohini Kumar RN  Outcome: Progressing     Problem: Risk for Elopement  Goal: Patient will not exit the unit/facility without proper excort  3/15/2025 1555 by Rohini Kumar RN  Outcome: Completed  3/15/2025 0928 by Rohini Kumar RN  Outcome: Progressing     Problem: ABCDS Injury Assessment  Goal: Absence of physical injury  3/15/2025 1555 by Rohini Kumar RN  Outcome: Completed  3/15/2025 0928 by Rohini Kumar RN  Outcome: Progressing

## 2025-07-14 ENCOUNTER — HOSPITAL ENCOUNTER (EMERGENCY)
Age: 20
Discharge: HOME | End: 2025-07-14
Attending: EMERGENCY MEDICINE
Payer: MEDICAID

## 2025-07-14 VITALS
HEART RATE: 120 BPM | RESPIRATION RATE: 18 BRPM | OXYGEN SATURATION: 99 % | DIASTOLIC BLOOD PRESSURE: 77 MMHG | SYSTOLIC BLOOD PRESSURE: 105 MMHG

## 2025-07-14 DIAGNOSIS — L02.91 ABSCESS: Primary | ICD-10-CM

## 2025-07-14 PROCEDURE — 6360000002 HC RX W HCPCS: Performed by: NURSE PRACTITIONER

## 2025-07-14 PROCEDURE — 10060 I&D ABSCESS SIMPLE/SINGLE: CPT

## 2025-07-14 PROCEDURE — 99283 EMERGENCY DEPT VISIT LOW MDM: CPT

## 2025-07-14 RX ORDER — DOXYCYCLINE HYCLATE 100 MG
100 TABLET ORAL 2 TIMES DAILY
Qty: 20 TABLET | Refills: 0 | Status: SHIPPED | OUTPATIENT
Start: 2025-07-14 | End: 2025-07-24

## 2025-07-14 RX ORDER — CEPHALEXIN 500 MG/1
500 CAPSULE ORAL 4 TIMES DAILY
Qty: 40 CAPSULE | Refills: 0 | Status: SHIPPED | OUTPATIENT
Start: 2025-07-14 | End: 2025-07-24

## 2025-07-14 RX ORDER — LIDOCAINE HYDROCHLORIDE 10 MG/ML
5 INJECTION, SOLUTION INFILTRATION; PERINEURAL ONCE
Status: COMPLETED | OUTPATIENT
Start: 2025-07-14 | End: 2025-07-14

## 2025-07-14 RX ADMIN — LIDOCAINE HYDROCHLORIDE 5 ML: 10 INJECTION, SOLUTION INFILTRATION; PERINEURAL at 09:56

## 2025-07-14 ASSESSMENT — ENCOUNTER SYMPTOMS
SHORTNESS OF BREATH: 0
COLOR CHANGE: 1

## 2025-07-14 NOTE — ED PROVIDER NOTES
[facility] EMERGENCY DEPARTMENT  eMERGENCY dEPARTMENT eNCOUnter      Pt Name: Luis Jacobs  MRN: [id number]  Birthdate 2005  Date of evaluation: 7/14/2025  Provider: MARITZA Doshi CNP    CHIEF COMPLAINT       Chief Complaint   Patient presents with    Abscess     Reports in between thighs          HISTORY OF PRESENT ILLNESS  (Location/Symptom, Timing/Onset, Context/Setting, Quality, Duration, Modifying Factors, Severity.)   Luis Jacobs is a 20 y.o. male who presents to the emergency department. C/o raised area to his perineum. Onset was 4 days ago. Denies fever, chills, injury. He attempted to drain the area without success. Rates his pain 3/10. He took Tylenol this morning which helped.      Nursing Notes were reviewed.    ALLERGIES     Environmental/seasonal    CURRENT MEDICATIONS       Discharge Medication List as of 7/14/2025  9:57 AM        CONTINUE these medications which have NOT CHANGED    Details   vitamin B-12 (CYANOCOBALAMIN) 100 MCG tablet Take 1 tablet by mouth daily, Disp-30 tablet, R-3Normal      lithium 300 MG tablet Take 2 tablets by mouth dailyHistorical Med      Vitamin D3 125 MCG (5000 UT) TABS tablet Take 1 tablet by mouth dailyHistorical Med      divalproex (DEPAKOTE) 500 MG DR tablet Take 1 tablet by mouth every 12 hours, Disp-90 tablet, R-3Normal      nicotine polacrilex (COMMIT) 2 MG lozenge Take 1 lozenge by mouth every 2 hours as needed for Smoking cessation, Disp-100 each, R-3OTC      risperiDONE (RISPERDAL) 0.5 MG tablet Take 1 tablet by mouth 2 times daily, Disp-60 tablet, R-0Normal             PAST MEDICAL HISTORY   No past medical history on file.    SURGICAL HISTORY     No past surgical history on file.      FAMILY HISTORY     No family history on file.  No family status information on file.        SOCIAL HISTORY      reports that he has never smoked. He uses smokeless tobacco. He reports that he does not currently use alcohol. He reports current